# Patient Record
Sex: FEMALE
[De-identification: names, ages, dates, MRNs, and addresses within clinical notes are randomized per-mention and may not be internally consistent; named-entity substitution may affect disease eponyms.]

---

## 2023-01-21 ENCOUNTER — NURSE TRIAGE (OUTPATIENT)
Dept: OTHER | Facility: CLINIC | Age: 53
End: 2023-01-21

## 2023-01-21 NOTE — TELEPHONE ENCOUNTER
Subjective: Caller states \"my wife has had a migraine for the past 4 days. Medication is not helping like it usually does. Should we go to the ER or can we just go to a walk in clinic\"     Current Symptoms: persistent migraine, prescribed migraine medication not working, dizziness-described as spinning currently. Onset: 4 days ago; waxing and waning    Associated Symptoms: reduced activity    Pain Severity: 8/10; pulsating; waxing and waning    Temperature: 100 by forehead thermometer      What has been tried: prescribed medication     LMP: NA Pregnant: NA    Recommended disposition: Go to ED Now    Care advice provided, patient verbalizes understanding; denies any other questions or concerns; instructed to call back for any new or worsening symptoms. Patient/caller agrees to proceed to the Emergency Department    This triage is a result of a call to [address] [place] [address]. Please do not respond to the triage nurse through this encounter. Any subsequent communication should be directly with the patient.       Reason for Disposition   Severe headache (e.g., excruciating)  (Exception: similar to previous migraines)    Protocols used: Dizziness - Vertigo-ADULT-